# Patient Record
Sex: MALE | Race: WHITE | NOT HISPANIC OR LATINO | Employment: FULL TIME | URBAN - METROPOLITAN AREA
[De-identification: names, ages, dates, MRNs, and addresses within clinical notes are randomized per-mention and may not be internally consistent; named-entity substitution may affect disease eponyms.]

---

## 2023-06-13 ASSESSMENT — PATIENT HEALTH QUESTIONNAIRE - PHQ9
10. IF YOU CHECKED OFF ANY PROBLEMS, HOW DIFFICULT HAVE THESE PROBLEMS MADE IT FOR YOU TO DO YOUR WORK, TAKE CARE OF THINGS AT HOME, OR GET ALONG WITH OTHER PEOPLE: NOT DIFFICULT AT ALL
SUM OF ALL RESPONSES TO PHQ QUESTIONS 1-9: 17
SUM OF ALL RESPONSES TO PHQ QUESTIONS 1-9: 17

## 2023-06-14 ENCOUNTER — OFFICE VISIT (OUTPATIENT)
Dept: FAMILY MEDICINE | Facility: CLINIC | Age: 23
End: 2023-06-14
Payer: COMMERCIAL

## 2023-06-14 VITALS
OXYGEN SATURATION: 97 % | BODY MASS INDEX: 40.43 KG/M2 | TEMPERATURE: 98.9 F | DIASTOLIC BLOOD PRESSURE: 74 MMHG | HEIGHT: 74 IN | HEART RATE: 110 BPM | WEIGHT: 315 LBS | SYSTOLIC BLOOD PRESSURE: 108 MMHG

## 2023-06-14 DIAGNOSIS — R05.1 ACUTE COUGH: Primary | ICD-10-CM

## 2023-06-14 DIAGNOSIS — F33.1 MODERATE RECURRENT MAJOR DEPRESSION (H): ICD-10-CM

## 2023-06-14 DIAGNOSIS — J30.2 SEASONAL ALLERGIC RHINITIS, UNSPECIFIED TRIGGER: ICD-10-CM

## 2023-06-14 PROCEDURE — 90471 IMMUNIZATION ADMIN: CPT | Performed by: NURSE PRACTITIONER

## 2023-06-14 PROCEDURE — 99203 OFFICE O/P NEW LOW 30 MIN: CPT | Mod: 25 | Performed by: NURSE PRACTITIONER

## 2023-06-14 PROCEDURE — 90715 TDAP VACCINE 7 YRS/> IM: CPT | Performed by: NURSE PRACTITIONER

## 2023-06-14 RX ORDER — FLUTICASONE PROPIONATE 220 UG/1
2 AEROSOL, METERED RESPIRATORY (INHALATION) 2 TIMES DAILY
Qty: 12 G | Refills: 1 | Status: SHIPPED | OUTPATIENT
Start: 2023-06-14 | End: 2023-07-12

## 2023-06-14 RX ORDER — PREDNISONE 20 MG/1
20 TABLET ORAL DAILY
Qty: 5 TABLET | Refills: 0 | Status: SHIPPED | OUTPATIENT
Start: 2023-06-14

## 2023-06-14 ASSESSMENT — PATIENT HEALTH QUESTIONNAIRE - PHQ9
SUM OF ALL RESPONSES TO PHQ QUESTIONS 1-9: 17
10. IF YOU CHECKED OFF ANY PROBLEMS, HOW DIFFICULT HAVE THESE PROBLEMS MADE IT FOR YOU TO DO YOUR WORK, TAKE CARE OF THINGS AT HOME, OR GET ALONG WITH OTHER PEOPLE: NOT DIFFICULT AT ALL

## 2023-06-14 ASSESSMENT — COLUMBIA-SUICIDE SEVERITY RATING SCALE - C-SSRS
1. WITHIN THE PAST MONTH, HAVE YOU WISHED YOU WERE DEAD OR WISHED YOU COULD GO TO SLEEP AND NOT WAKE UP?: NO
6. HAVE YOU EVER DONE ANYTHING, STARTED TO DO ANYTHING, OR PREPARED TO DO ANYTHING TO END YOUR LIFE?: NO
2. IN THE PAST MONTH, HAVE YOU ACTUALLY HAD ANY THOUGHTS OF KILLING YOURSELF?: NO

## 2023-06-14 NOTE — PROGRESS NOTES
"  Assessment & Plan     (R05.1) Acute cough  (primary encounter diagnosis)  Comment: Acute cough  (primary encounter diagnosis)  Comment: symptoms which occur yearly suggests a reactive airway type condition.  Counseled that he might benefit from using a steroid inhaler every spring but at this time he should continue his Claritin, add a decongestant, start the oral prednisone as well as the Flovent and may need to continue the Flovent until this allergen has passed possibly 3 to 6 weeks.  He expresses understanding  Plan: predniSONE (DELTASONE) 20 MG tablet,         fluticasone (FLOVENT HFA) 220 MCG/ACT inhaler,         MISCELLANEOUS DME SUPPLY OR ACCESSORY, NOT         OTHERWISE SPECIFIED, Optichamber/Spacer Order         for DME - ONLY FOR DME          (J30.2) Seasonal allergic rhinitis, unspecified trigger  Comment: Continue with antihistamine  Plan: Optichamber/Spacer Order for DME - ONLY FOR DME            (F33.1) Moderate recurrent major depression (H)  Comment: Counseled that he might be a good candidate for medication but he is fearful of side effects and was once told that his type of depression would not benefit from medication.  He is agreeable to counseling and orders were placed  Plan: Adult Mental Health  Referral                         BMI:   Estimated body mass index is 45.28 kg/m  as calculated from the following:    Height as of this encounter: 1.88 m (6' 2\").    Weight as of this encounter: 160 kg (352 lb 11.2 oz).       Depression Screening Follow Up        6/13/2023     9:48 AM   PHQ   PHQ-9 Total Score 17   Q9: Thoughts of better off dead/self-harm past 2 weeks Nearly every day   F/U: Thoughts of suicide or self-harm Yes   F/U: Self harm-plan No   F/U: Self-harm action No   F/U: Safety concerns No         6/13/2023     9:48 AM   Last PHQ-9   1.  Little interest or pleasure in doing things 3   2.  Feeling down, depressed, or hopeless 3   3.  Trouble falling or staying asleep, or " sleeping too much 1   4.  Feeling tired or having little energy 1   5.  Poor appetite or overeating 2   6.  Feeling bad about yourself 3   7.  Trouble concentrating 1   8.  Moving slowly or restless 0   Q9: Thoughts of better off dead/self-harm past 2 weeks 3   PHQ-9 Total Score 17   In the past two weeks have you had thoughts of suicide or self harm? Yes   Do you have concerns about your personal safety or the safety of others? No   In the past 2 weeks have you thought about a plan or had intention to harm yourself? No   In the past 2 weeks have you acted on these thoughts in any way? No             6/14/2023     3:26 PM   C-SSRS ([facility])   Within the last month, have you wished you were dead or wished you could go to sleep and not wake up? No   Within the last month, have you had any actual thoughts of killing yourself? No   Within the last month, have you ever done anything, started to do anything, or prepared to do anything to end your life? No         Follow Up        Follow Up Actions Taken  Crisis resource information provided in the After Visit Summary  Mental Health Referral placed    Discussed the following ways the patient can remain in a safe environment:  be around others and He has supportive parents and a brother that lives with him that he can talk to      Sherry Beth NP  [facility]    Nallely Perez is a 23 year old, presenting for the following health issues: cough x2 weeks, cough is mostly dry, hurts throat to cough, not improving, did have some other symptoms too that have resolved - runny nose, HA, has not done any COVID testing - does have a positive PHQ9 today    Still itchy eyes and runny nose but cough isn't better  Uses Claritin most days  No decongestant or nasal spray use  Had asthma age 1, gets   No chief complaint on file.        6/14/2023     1:54 PM   Additional Questions   Roomed by yamila castillo   Accompanied by self     History of Present  "Illness       Reason for visit:  Hacking, persistent cough that is unresponsove to medication  Symptom onset:  1-2 weeks ago  Symptom intensity:  Moderate  Symptom progression:  Worsening  Had these symptoms before:  No    He eats 0-1 servings of fruits and vegetables daily.He consumes 4 sweetened beverage(s) daily.He exercises with enough effort to increase his heart rate 9 or less minutes per day.  He exercises with enough effort to increase his heart rate 3 or less days per week.   He is taking medications regularly.    Today's PHQ-9         PHQ-9 Total Score: 17    PHQ-9 Q9 Thoughts of better off dead/self-harm past 2 weeks :   Nearly every day  Thoughts of suicide or self harm: (P) Yes  Self-harm Plan:   (P) No  Self-harm Action:     (P) No  Safety concerns for self or others: (P) No    How difficult have these problems made it for you to do your work, take care of things at home, or get along with other people: Not difficult at all               Review of Systems         Objective    /74 (BP Location: Right arm, Patient Position: Sitting)   Pulse 110   Temp 98.9  F (37.2  C)   Ht 1.88 m (6' 2\")   Wt (!) 160 kg (352 lb 11.2 oz)   SpO2 97%   BMI 45.28 kg/m    Body mass index is 45.28 kg/m .  Physical Exam                       "

## 2023-06-23 ENCOUNTER — VIRTUAL VISIT (OUTPATIENT)
Dept: PSYCHOLOGY | Facility: CLINIC | Age: 23
End: 2023-06-23
Attending: NURSE PRACTITIONER
Payer: COMMERCIAL

## 2023-06-23 DIAGNOSIS — F33.1 MODERATE RECURRENT MAJOR DEPRESSION (H): ICD-10-CM

## 2023-06-23 PROCEDURE — 90837 PSYTX W PT 60 MINUTES: CPT | Mod: VID

## 2023-06-23 ASSESSMENT — ANXIETY QUESTIONNAIRES
3. WORRYING TOO MUCH ABOUT DIFFERENT THINGS: MORE THAN HALF THE DAYS
7. FEELING AFRAID AS IF SOMETHING AWFUL MIGHT HAPPEN: MORE THAN HALF THE DAYS
6. BECOMING EASILY ANNOYED OR IRRITABLE: MORE THAN HALF THE DAYS
7. FEELING AFRAID AS IF SOMETHING AWFUL MIGHT HAPPEN: MORE THAN HALF THE DAYS
IF YOU CHECKED OFF ANY PROBLEMS ON THIS QUESTIONNAIRE, HOW DIFFICULT HAVE THESE PROBLEMS MADE IT FOR YOU TO DO YOUR WORK, TAKE CARE OF THINGS AT HOME, OR GET ALONG WITH OTHER PEOPLE: SOMEWHAT DIFFICULT
2. NOT BEING ABLE TO STOP OR CONTROL WORRYING: NEARLY EVERY DAY
1. FEELING NERVOUS, ANXIOUS, OR ON EDGE: SEVERAL DAYS
GAD7 TOTAL SCORE: 14
GAD7 TOTAL SCORE: 14
4. TROUBLE RELAXING: MORE THAN HALF THE DAYS
GAD7 TOTAL SCORE: 14
5. BEING SO RESTLESS THAT IT IS HARD TO SIT STILL: MORE THAN HALF THE DAYS
8. IF YOU CHECKED OFF ANY PROBLEMS, HOW DIFFICULT HAVE THESE MADE IT FOR YOU TO DO YOUR WORK, TAKE CARE OF THINGS AT HOME, OR GET ALONG WITH OTHER PEOPLE?: SOMEWHAT DIFFICULT

## 2023-06-23 ASSESSMENT — COLUMBIA-SUICIDE SEVERITY RATING SCALE - C-SSRS
REASONS FOR IDEATION PAST MONTH: DOES NOT APPLY
TOTAL  NUMBER OF INTERRUPTED ATTEMPTS PAST 3 MONTHS: NO
3. HAVE YOU BEEN THINKING ABOUT HOW YOU MIGHT KILL YOURSELF?: YES
LETHALITY/MEDICAL DAMAGE CODE MOST LETHAL ACTUAL ATTEMPT: NO PHYSICAL DAMAGE OR VERY MINOR PHYSICAL DAMAGE
LETHALITY/MEDICAL DAMAGE CODE FIRST POTENTIAL ATTEMPT: BEHAVIOR NOT LIKELY TO RESULT IN INJURY
2. HAVE YOU ACTUALLY HAD ANY THOUGHTS OF KILLING YOURSELF?: YES
6. HAVE YOU EVER DONE ANYTHING, STARTED TO DO ANYTHING, OR PREPARED TO DO ANYTHING TO END YOUR LIFE?: NO
TOTAL  NUMBER OF ABORTED OR SELF INTERRUPTED ATTEMPTS LIFETIME: YES
TOTAL  NUMBER OF INTERRUPTED ATTEMPTS LIFETIME: 10
TOTAL  NUMBER OF INTERRUPTED ATTEMPTS LIFETIME: YES
1. IN THE PAST MONTH, HAVE YOU WISHED YOU WERE DEAD OR WISHED YOU COULD GO TO SLEEP AND NOT WAKE UP?: YES
LETHALITY/MEDICAL DAMAGE CODE MOST LETHAL POTENTIAL ATTEMPT: BEHAVIOR NOT LIKELY TO RESULT IN INJURY
5. HAVE YOU STARTED TO WORK OUT OR WORKED OUT THE DETAILS OF HOW TO KILL YOURSELF? DO YOU INTEND TO CARRY OUT THIS PLAN?: NO
ATTEMPT LIFETIME: YES
TOTAL  NUMBER OF ABORTED OR SELF INTERRUPTED ATTEMPTS PAST 3 MONTHS: NO
LETHALITY/MEDICAL DAMAGE CODE MOST RECENT POTENTIAL ATTEMPT: BEHAVIOR NOT LIKELY TO RESULT IN INJURY
2. HAVE YOU ACTUALLY HAD ANY THOUGHTS OF KILLING YOURSELF?: NO
REASONS FOR IDEATION LIFETIME: COMPLETELY TO END OR STOP THE PAIN (YOU COULDN'T GO ON LIVING WITH THE PAIN OR HOW YOU WERE FEELING)
1. HAVE YOU WISHED YOU WERE DEAD OR WISHED YOU COULD GO TO SLEEP AND NOT WAKE UP?: YES
ATTEMPT PAST THREE MONTHS: NO
TOTAL  NUMBER OF ACTUAL ATTEMPTS LIFETIME: 10
4. HAVE YOU HAD THESE THOUGHTS AND HAD SOME INTENTION OF ACTING ON THEM?: YES

## 2023-06-23 ASSESSMENT — PATIENT HEALTH QUESTIONNAIRE - PHQ9
SUM OF ALL RESPONSES TO PHQ QUESTIONS 1-9: 19
SUM OF ALL RESPONSES TO PHQ QUESTIONS 1-9: 19
10. IF YOU CHECKED OFF ANY PROBLEMS, HOW DIFFICULT HAVE THESE PROBLEMS MADE IT FOR YOU TO DO YOUR WORK, TAKE CARE OF THINGS AT HOME, OR GET ALONG WITH OTHER PEOPLE: NOT DIFFICULT AT ALL

## 2023-06-23 NOTE — PROGRESS NOTES
[facility]   Mental Health & Addiction Services     Progress Note - Initial Visit    Patient  Name:  Chris Pelaez Date: 23         Service Type: Individual     Visit Start Time: 11:00 AM  Visit End Time: 12:10 PM    Visit #: 1    Attendees: Client attended alone    Service Modality:  Video Visit:      Provider verified identity through the following two step process.  Patient provided:  Patient  and Patient address    Telemedicine Visit: The patient's condition can be safely assessed and treated via synchronous audio and visual telemedicine encounter.      Reason for Telemedicine Visit: Services only offered telehealth    Originating Site (Patient Location): Patient's home    Distant Site (Provider Location): Provider Remote Setting- Home Office    Consent:  The patient/guardian has verbally consented to: the potential risks and benefits of telemedicine (video visit) versus in person care; bill my insurance or make self-payment for services provided; and responsibility for payment of non-covered services.     Patient would like the video invitation sent by:  My Chart    Mode of Communication:  Video Conference via Amwell    Distant Location (Provider):  Off-site    As the provider I attest to compliance with applicable laws and regulations related to telemedicine.       DATA:   Extended Session (53+ minutes):   - Patient's presenting concerns require more intensive intervention than could be completed within the usual service  Interactive Complexity: No  Crisis: No      Presenting Concerns/  Current Stressors:   Patient reports recently going to the doctor due to throat troubles involving the air quality Patient reports recently receiving a diagnosis for asthma aggravated by the poor air quality. Patient also reports receiving a referral from his primary care doctor due to his responses to the PHQ-9 assessment. Today, patient and therapist started the diagnostic assessment, but  were unable to complete due to time constraints.  Therapist assessed for risk and safety - no concerns at this time.  Patient and therapist will continue with the DA during next session.     ASSESSMENT:  Mental Status Assessment:  Appearance:   Appropriate   Eye Contact:   Fair   Psychomotor Behavior: Normal   Attitude:   Cooperative  Interested  Orientation:   All  Speech   Rate / Production: Normal/ Responsive   Volume:  Normal   Mood:    Depressed  Normal  Affect:    Appropriate   Thought Content:  Clear   Thought Form:  Coherent   Insight:    Good       Safety Issues and Plan for Safety and Risk Management:   [place] Suicide Severity Rating Scale (Lifetime/Recent)      6/23/2023    11:42 AM   [place] Suicide Severity Rating (Lifetime/Recent)   1. Wish to be Dead (Lifetime) Y   Wish to be Dead Description (Lifetime) Patient reports when he was younger hitting his head   1. Wish to be Dead (Past 1 Month) Y   Wish to be Dead Description (Past 1 Month) Passive suicide   2. Non-Specific Active Suicidal Thoughts (Lifetime) Y   Non-Specific Active Suicidal Thought Description (Lifetime) Younger   2. Non-Specific Active Suicidal Thoughts (Past 1 Month) N   3. Active Suicidal Ideation with any Methods (Not Plan) Without Intent to Act (Lifetime) Y   Active Suicidal Ideation with any Methods (Not Plan) Description (Lifetime) Hitting his head   3. Active Suicidal Ideation with any Methods (Not Plan) Without Intent to Act (Past 1 Month) N   4. Active Suicidal Ideation with Some Intent to Act, Without Specific Plan (Lifetime) Y   Active Suicidal Ideation with Some Intent to Act, Without Specific Plan Description (Lifetime) Middle School   5. Active Suicidal Ideation with Specific Plan and Intent (Lifetime) N   Most Severe Ideation Rating (Lifetime) 5   Description of Most Severe Ideation (Lifetime) Middle School   Most Severe Ideation Rating (Past 1 Month) 1   Frequency (Lifetime) 4   Frequency (Past 1 Month) 1   Duration  (Lifetime) 5   Duration (Past 1 Month) 1   Controllability (Lifetime) 5   Controllability (Past 1 Month) 0   Deterrents (Lifetime) 2   Deterrents (Past 1 Month) 0   Reasons for Ideation (Lifetime) 5   Reasons for Ideation (Past 1 Month) 0   Actual Attempt (Lifetime) Y   Total Number of Actual Attempts (Lifetime) 10   Actual Attempt Description (Lifetime) Elementary-Middle School   Actual Attempt (Past 3 Months) N   Has subject engaged in non-suicidal self-injurious behavior? (Lifetime) Y   Has subject engaged in non-suicidal self-injurious behavior? (Past 3 Months) N   Interrupted Attempts (Lifetime) Y   Total Number of Interrupted Attempts (Lifetime) 10   Interrupted Attempt Description (Lifetime) He would become knocked out   Interrupted Attempts (Past 3 Months) N   Aborted or Self-Interrupted Attempt (Lifetime) Y   Aborted or Self-Interrupted Attempt (Past 3 Months) N   Preparatory Acts or Behavior (Lifetime) N   Potential Lethality Code (Most Recent Attempt) 0   Actual Lethality/Medical Damage Code (Most Lethal Attempt) 0   Potential Lethality Code (Most Lethal Attempt) 0   Potential Lethality Code (Initial/First Attempt) 0   Calculated C-SSRS Risk Score (Lifetime/Recent) Moderate Risk     Patient denies current fears or concerns for personal safety.  Patient reports the following current or recent suicidal ideation or behaviors: passive suicidal ideation.  Patient denies current or recent homicidal ideation or behaviors.  Patient denies current or recent self injurious behavior or ideation.  Patient denies other safety concerns.  Recommended that patient call 911 or go to the local ED should there be a change in any of these risk factors.  Patient reports there are firearms in the house. The firearms are secured in a locked space.     Diagnostic Criteria:  Major Depressive Disorder   - Depressed mood. Note: In children and adolescents, can be irritable mood.     - Diminished interest or pleasure in all, or  "almost all, activities.    - Fatigue or loss of energy.    - Feelings of worthlessness or inappropriate and excessive guilt.    - Diminished ability to think or concentrate, or indecisiveness.    - Recurrent thoughts of death (not just fear of dying), recurrent suicidal ideation without a specific plan, or a suicide attempt or a specific plan for committing suicide.   D) The occurence of major depressive episode is not better explained by other thought / psychotic disorders      DSM5 Diagnoses: (Sustained by DSM5 Criteria Listed Above)  Diagnoses: 296.32 (F33.1) Major Depressive Disorder, Recurrent Episode, Moderate _ and With anxious distress  Psychosocial & Contextual Factors: Patient reports being \"miserable\" all of the time.   WHODAS 2.0 (12 item):        No data to display              Intervention:   Completed through review of safety issues and safety interventions, Completed Safety plan and Educated on treatment planning and started identifying goals and interventions for treatment plan  Collateral Reports Completed:  Not Applicable      PLAN: (Homework, other):  1. Provider will continue Diagnostic Assessment.  Patient was given the following to do until next session: NA.     2. Provider recommended the following referrals: NA.      3.  Suicide Risk and Safety Concerns were assessed for Chris Pelaez.    Patient meets the following risk assessment and triage:   Moderate Risk is identified when the patient has one of the following:  Suicidal Ideation with method (The How) Without plan, intent, or behavior in the past month (Yes to C-SSRS Ideation #3)    The following has been recommended:  Complete/Review/Update Safety Plan    Safety Plan:  Adult Short Safety Plan:   Name: Chris Pelaez  YOB: 2000  Date: June 23, 2023   My primary care provider: Hillary Ref-Primary, Physician  My primary care clinic: NA  My prescriber: NA  Other care team support:     My Triggers:  Stress, confrontation, and " "thinking about his mental state and where it has been at.     Additional People, Places, and Things that I can access for support: His family and his brother         What is important to me and makes life worth living: \"The fact that he doesn't feel like dying\".       GREEN    Good Control  1. I feel good  2. No suicidal thoughts   3. Can work, sleep and play      Action Steps  1. Self-care: balanced meals, exercising, sleep practices, etc.  2. Take your medications as prescribed.  3. Continue meetings with therapist and prescriber.  4.  Do the healthy things that I enjoy.           YELLOW  Getting Worse  I have ANY of these:  1. I do not feel good  2. Difficulty Concentrating  3. Sleep is changing  4. Increase/Change in my thoughts to hurt self and/or others, but I can still manage and not act on it.   5. Not taking care of self.             Action Steps (in addition to the above):  1. Inform your therapist and psychiatric prescriber/PCP.  2. Keep taking your medications as prescribed.    3. Turn to people you can ask for help.  4. Use internal coping strategies -see below.  5. Create safe environment: notify friends/family of increase in symptoms           RED  Get Help  If I have ANY of these:  1. Current and uncontrollable thoughts and/or behaviors to hurt self and/or others.    Actions to manage my safety  1. Contact your emergency person- his Father [phone]  2. Call or Text [phone]  3. Call my crisis team- [facility] [phone]  3. Or Call 911 or go to the emergency room right away  4.         My Internal Coping Strategies include the following:  use my coping skills    [End for Brief Safety Plan]     Safety Concerns  How To Identify Situations That Make Your Mental Health Worse:  Triggers are things that make your mental health worse.  Look at the list below to help you find your triggers and what you can do about them.     1. Identify Early Warning Signs:    Sometimes symptoms return, even when " people do their best to stay well. Symptoms can develop over a short period of time with little or no warning, but most of the time they emerge gradually over several weeks.  Early warning signs are changes that people experience when a relapse is starting. Some early warning signs are common and others are not as common.   Common Early Warning Signs:    Urges to harm self     2. Identify action steps to take when warning signs are noticed:    Taking Action- It is important to take action if you are experiencing early warning signs of a relapse.  The faster you act, the more likely it is that you can avoid a full relapse.  It is helpful to identify several specific ways to cope with symptoms.      The following is my list of symptoms and coping strategies that I can use when they are present:    Symptom Coping Strategies   Anxiety -Talk with someone in your support system and let him or her know how you are feeling.  -Use relaxation techniques such as deep breathing or imagery.  -Use positive affirmations to counteract negative self-talk such as  I am learning to let go of worry.    Depression - Schedule your day; include activities you have to do and activities you enjoy doing.  - Get some exercise - walk, run, bike, or swim.  - Give yourself credit for even the smallest things you get done.   Sleep Difficulties   - Go to sleep at the same time every day.  - Do something relaxing before bed, such as drinking herbal tea or listening to music.  - Avoid having discussions about upsetting topics before going to bed.   Delusions   - Distract yourself from the disturbing thought by doing something that requires your attention such as a puzzle.  - Check out your beliefs by talking to someone you trust.    Hallucinations   - Use headphones to listen to music.  - Tell voices to  stop  or say to yourself,  I am safe.   - Ignore the hallucinations as much as possible; focus on other things.   Concentration Difficulties -  Minimize distractions so there is only one thing for you to focus on at a time.    - Ask the person you are having a conversation with to slow down or repeat things you are unsure of.                 LILY TORRES  June 23, 2023    This note has been reviewed and I agree with the plan of care. This note is co-signed by CIRO Cosby, [facility] Center[facility], Supervisor, on: June 28, 2023

## 2023-07-12 DIAGNOSIS — R05.1 ACUTE COUGH: ICD-10-CM

## 2023-07-12 RX ORDER — BUDESONIDE 180 UG/1
1 AEROSOL, POWDER RESPIRATORY (INHALATION) 2 TIMES DAILY
Qty: 3 EACH | Refills: 3 | Status: SHIPPED | OUTPATIENT
Start: 2023-07-12

## 2023-07-12 NOTE — TELEPHONE ENCOUNTER
Routing refill request to provider for review/approval because:  LOV 6/04/2023         MATILDA Boudreaux  [facility]   Dressing (No Sutures): dry sterile dressing

## 2023-07-23 ENCOUNTER — HEALTH MAINTENANCE LETTER (OUTPATIENT)
Age: 23
End: 2023-07-23

## 2023-08-20 PROCEDURE — 99285 EMERGENCY DEPT VISIT HI MDM: CPT | Mod: 25

## 2023-08-20 PROCEDURE — 96374 THER/PROPH/DIAG INJ IV PUSH: CPT

## 2023-08-21 ENCOUNTER — APPOINTMENT (OUTPATIENT)
Dept: ULTRASOUND IMAGING | Facility: HOSPITAL | Age: 23
End: 2023-08-21
Attending: EMERGENCY MEDICINE
Payer: COMMERCIAL

## 2023-08-21 ENCOUNTER — HOSPITAL ENCOUNTER (EMERGENCY)
Facility: HOSPITAL | Age: 23
Discharge: HOME | End: 2023-08-21
Attending: EMERGENCY MEDICINE | Admitting: EMERGENCY MEDICINE
Payer: COMMERCIAL

## 2023-08-21 VITALS
HEIGHT: 74 IN | WEIGHT: 315 LBS | TEMPERATURE: 98.7 F | HEART RATE: 98 BPM | OXYGEN SATURATION: 97 % | SYSTOLIC BLOOD PRESSURE: 102 MMHG | BODY MASS INDEX: 40.43 KG/M2 | RESPIRATION RATE: 18 BRPM | DIASTOLIC BLOOD PRESSURE: 65 MMHG

## 2023-08-21 DIAGNOSIS — R10.13 ABDOMINAL PAIN, EPIGASTRIC: ICD-10-CM

## 2023-08-21 LAB
ALBUMIN SERPL BCG-MCNC: 4.7 G/DL (ref 3.5–5.2)
ALP SERPL-CCNC: 138 U/L (ref 40–129)
ALT SERPL W P-5'-P-CCNC: 31 U/L (ref 0–70)
ANION GAP SERPL CALCULATED.3IONS-SCNC: 14 MMOL/L (ref 7–15)
AST SERPL W P-5'-P-CCNC: 29 U/L (ref 0–45)
BILIRUB SERPL-MCNC: 1 MG/DL
BUN SERPL-MCNC: 14.2 MG/DL (ref 6–20)
CALCIUM SERPL-MCNC: 10 MG/DL (ref 8.6–10)
CHLORIDE SERPL-SCNC: 99 MMOL/L (ref 98–107)
CREAT SERPL-MCNC: 1.03 MG/DL (ref 0.67–1.17)
D DIMER PPP FEU-MCNC: 0.37 UG/ML FEU (ref 0–0.5)
DEPRECATED HCO3 PLAS-SCNC: 24 MMOL/L (ref 22–29)
ERYTHROCYTE [DISTWIDTH] IN BLOOD BY AUTOMATED COUNT: 14.6 % (ref 10–15)
GFR SERPL CREATININE-BSD FRML MDRD: >90 ML/MIN/1.73M2
GLUCOSE SERPL-MCNC: 128 MG/DL (ref 70–99)
HCT VFR BLD AUTO: 54.6 % (ref 40–53)
HGB BLD-MCNC: 18.1 G/DL (ref 13.3–17.7)
HOLD SPECIMEN: NORMAL
HOLD SPECIMEN: NORMAL
LIPASE SERPL-CCNC: 37 U/L (ref 13–60)
MCH RBC QN AUTO: 27.7 PG (ref 26.5–33)
MCHC RBC AUTO-ENTMCNC: 33.2 G/DL (ref 31.5–36.5)
MCV RBC AUTO: 84 FL (ref 78–100)
PLATELET # BLD AUTO: 405 10E3/UL (ref 150–450)
POTASSIUM SERPL-SCNC: 4.1 MMOL/L (ref 3.4–5.3)
PROT SERPL-MCNC: 8.6 G/DL (ref 6.4–8.3)
RBC # BLD AUTO: 6.53 10E6/UL (ref 4.4–5.9)
SODIUM SERPL-SCNC: 137 MMOL/L (ref 136–145)
WBC # BLD AUTO: 9.5 10E3/UL (ref 4–11)

## 2023-08-21 PROCEDURE — 36415 COLL VENOUS BLD VENIPUNCTURE: CPT | Performed by: STUDENT IN AN ORGANIZED HEALTH CARE EDUCATION/TRAINING PROGRAM

## 2023-08-21 PROCEDURE — 80053 COMPREHEN METABOLIC PANEL: CPT | Performed by: EMERGENCY MEDICINE

## 2023-08-21 PROCEDURE — 250N000011 HC RX IP 250 OP 636: Performed by: EMERGENCY MEDICINE

## 2023-08-21 PROCEDURE — 85027 COMPLETE CBC AUTOMATED: CPT | Performed by: EMERGENCY MEDICINE

## 2023-08-21 PROCEDURE — 83690 ASSAY OF LIPASE: CPT | Performed by: EMERGENCY MEDICINE

## 2023-08-21 PROCEDURE — 85379 FIBRIN DEGRADATION QUANT: CPT | Performed by: EMERGENCY MEDICINE

## 2023-08-21 PROCEDURE — 76705 ECHO EXAM OF ABDOMEN: CPT

## 2023-08-21 PROCEDURE — 36415 COLL VENOUS BLD VENIPUNCTURE: CPT | Performed by: EMERGENCY MEDICINE

## 2023-08-21 RX ORDER — KETOROLAC TROMETHAMINE 30 MG/ML
30 INJECTION, SOLUTION INTRAMUSCULAR; INTRAVENOUS ONCE
Status: COMPLETED | OUTPATIENT
Start: 2023-08-21 | End: 2023-08-21

## 2023-08-21 RX ORDER — OMEPRAZOLE 40 MG/1
40 CAPSULE, DELAYED RELEASE ORAL DAILY
Qty: 30 CAPSULE | Refills: 0 | Status: SHIPPED | OUTPATIENT
Start: 2023-08-21

## 2023-08-21 RX ADMIN — KETOROLAC TROMETHAMINE 30 MG: 30 INJECTION INTRAMUSCULAR; INTRAVENOUS at 01:33

## 2023-08-21 ASSESSMENT — ACTIVITIES OF DAILY LIVING (ADL): ADLS_ACUITY_SCORE: 35

## 2023-08-21 NOTE — ED TRIAGE NOTES
Patient reports abdominal pain across his upper abdomen that started Friday. Denies nausea, vomiting or diarrhea.      Triage Assessment       Row Name 08/20/23 [id number]       Triage Assessment (Adult)    Airway WDL WDL       Respiratory WDL    Respiratory WDL WDL       Cardiac WDL    Cardiac WDL WDL       Cognitive/Neuro/Behavioral WDL    Cognitive/Neuro/Behavioral WDL WDL

## 2023-08-21 NOTE — ED PROVIDER NOTES
EMERGENCY DEPARTMENT ENCOUNTER      NAME: Chris Pelaez  AGE: 23 year old male  YOB: 2000  MRN: [id number]  EVALUATION DATE & TIME: 8/21/2023  1:06 AM    PCP: No Ref-Primary, Physician    ED PROVIDER: Kaur Peres MD    Chief Complaint   Patient presents with    Abdominal Pain         FINAL IMPRESSION:  1. Abdominal pain, epigastric          ED COURSE & MEDICAL DECISION MAKING:    Pertinent Labs & Imaging studies reviewed. (See chart for details)  23 year old male with history of morbid obesity, asthma, depression who presents to the Emergency Department for evaluation of upper abdominal pain x2 days.  Differential includes GERD, gastritis, pancreatitis, hepatobiliary pathology.  He does note that it is also slightly worse with both movement and inspiration.  Slightly tachycardic.  Concern for PE though really no risk factors for same.  Pain is really not made reproducible with palpation of the chest wall on examination but musculoskeletal etiology on the differential.    Patient placed on monitor, IV established and blood obtained.  Given 30 mg Toradol for pain.  CBC, CMP, lipase, D-dimer obtained with minimally elevated alk phos of 138 and slight hemoconcentration with hemoglobin of 18.1.  Given strong family history of cholelithiasis and his location of pain a dedicated right upper quadrant ultrasound was obtained, unremarkable.  Given negative ED work-up above, safe for discharge to home.  He does not describe much pain worse with oral intake but seems how everything else is negative above, will trial antacid for home.  Given PCP referral to establish care, warning signs return to ED discussed.      ED Course as of 08/21/23 0444   Mon Aug 21, 2023   0111 I met with the patient to gather history and perform my exam. ED course and treatment discussed.    0130 Hemoglobin(!): 18.1  Hemoconcentrated   0156 D-Dimer Quantitative: 0.37   0232 Abdomen US, limited (RUQ only)  Ultrasound independently  interpreted by myself without visualized cholelithiasis   0241 I updated the patient with lab and imaging results and discussed the plan for discharge.          Medical Decision Making    History:  Supplemental history from: Family Member/Significant Other  External Record(s) reviewed: Outpatient Record: 6/14/2023 office visit    Work Up:  Chart documentation includes differential considered and any EKGs or imaging independently interpreted by provider, see MDM  In additional to work up documented, I considered the following work up: see MDM    External consultation:  Discussion of management with another provider: N/A    Complicating factors:  Care impacted by chronic illness: Chronic Lung Disease and Mental Health  Care affected by social determinants of health: Access to Medical Care weekend night shift no access to PCP    Disposition considerations: Discharge. I prescribed additional prescription strength medication(s) as charted. See documentation for any additional details.        At the conclusion of the encounter I discussed the results of all of the tests and the disposition. The questions were answered. The patient or family acknowledged understanding and was agreeable with the care plan.      MEDICATIONS GIVEN IN THE EMERGENCY:  Medications   ketorolac (TORADOL) injection 30 mg (30 mg Intravenous $Given 8/21/23 0133)       NEW PRESCRIPTIONS STARTED AT TODAY'S ER VISIT  Discharge Medication List as of 8/21/2023  2:45 AM        START taking these medications    Details   omeprazole (PRILOSEC) 40 MG DR capsule Take 1 capsule (40 mg) by mouth daily, Disp-30 capsule, R-0, Local Print                =================================================================    HPI    Patient information was obtained from: Patient     Use of Intrepreter: N/A      Chris TINOCO Benigno is a 23 year old male with pertinent history of obesity, asthma, and depression who presents with abdominal pain     The patient reports  "epigastric abdominal pain for three days that worsens with deep breathing and movement. The patient denies nausea, vomiting, problems with bowel movements, problems with urination, problems with eating, con, shortness of breath, back pain, or any other symptoms or complaints. The patient denies a history of blood clots. The patient's brother notes that the patient is the only one in their family who has not had a cholecystectomy.    PAST MEDICAL HISTORY:  Past Medical History:   Diagnosis Date    Depressive disorder     Uncomplicated asthma        PAST SURGICAL HISTORY:  No past surgical history on file.    CURRENT MEDICATIONS:    Prior to Admission Medications   Prescriptions Last Dose Informant Patient Reported? Taking?   budesonide (PULMICORT FLEXHALER) 180 MCG/ACT inhaler   No No   Sig: Inhale 1 puff into the lungs 2 times daily   predniSONE (DELTASONE) 20 MG tablet   No No   Sig: Take 1 tablet (20 mg) by mouth daily      Facility-Administered Medications: None       ALLERGIES:  No Known Allergies    FAMILY HISTORY:  No family history on file.    SOCIAL HISTORY:  Social History     Tobacco Use    Smoking status: Never     Passive exposure: Current    Smokeless tobacco: Never   Vaping Use    Vaping Use: Never used   Substance Use Topics    Alcohol use: No    Drug use: No        VITALS:  Patient Vitals for the past 24 hrs:   BP Temp Temp src Pulse Resp SpO2 Height Weight   08/21/23 0245 102/65 -- -- 98 -- 97 % -- --   08/21/23 0145 126/64 -- -- 80 -- 96 % -- --   08/21/23 0130 119/62 -- -- 79 -- 98 % -- --   08/20/23 2353 -- -- -- -- -- -- -- (!) 159.7 kg (352 lb)   08/20/23 2351 -- -- -- -- -- -- 1.88 m (6' 2\") --   08/20/23 2350 136/87 98.7  F (37.1  C) Temporal 105 18 97 % -- --       PHYSICAL EXAM    General Appearance: Well-appearing, no acute distress. Patient is obese and disheveled   Head:  Normocephalic  Neck:  Supple  Chest:  No tenderness or deformity, no crepitus  Cardio:  Regular rhythm, no " murmur/gallop/rub. Minimally tachycardic to 105   Pulm:  No respiratory distress, clear to auscultation bilaterally  Back:  No CVA tenderness, normal ROM  Abdomen:  Soft, non distended,no rebound or guarding. No reproducible abdominal tenderness on exam  Extremities: Moves extremities normally, normal gait.  No peripheral edema  Neuro:  Alert and oriented ×3    RADIOLOGY/LABS:  Reviewed all pertinent imaging. Please see official radiology report. All pertinent labs reviewed and interpreted.    Results for orders placed or performed during the hospital encounter of 08/21/23   Abdomen US, limited (RUQ only)    Impression    IMPRESSION:  Normal limited abdominal ultrasound.       Extra Green Top (Lithium Heparin) Tube   Result Value Ref Range    Hold Specimen JIC    Extra Purple Top Tube   Result Value Ref Range    Hold Specimen JIC    CBC (+ platelets, no diff)   Result Value Ref Range    WBC Count 9.5 4.0 - 11.0 10e3/uL    RBC Count 6.53 (H) 4.40 - 5.90 10e6/uL    Hemoglobin 18.1 (H) 13.3 - 17.7 g/dL    Hematocrit 54.6 (H) 40.0 - 53.0 %    MCV 84 78 - 100 fL    MCH 27.7 26.5 - 33.0 pg    MCHC 33.2 31.5 - 36.5 g/dL    RDW 14.6 10.0 - 15.0 %    Platelet Count 405 150 - 450 10e3/uL   Comprehensive metabolic panel   Result Value Ref Range    Sodium 137 136 - 145 mmol/L    Potassium 4.1 3.4 - 5.3 mmol/L    Chloride 99 98 - 107 mmol/L    Carbon Dioxide (CO2) 24 22 - 29 mmol/L    Anion Gap 14 7 - 15 mmol/L    Urea Nitrogen 14.2 6.0 - 20.0 mg/dL    Creatinine 1.03 0.67 - 1.17 mg/dL    Calcium 10.0 8.6 - 10.0 mg/dL    Glucose 128 (H) 70 - 99 mg/dL    Alkaline Phosphatase 138 (H) 40 - 129 U/L    AST 29 0 - 45 U/L    ALT 31 0 - 70 U/L    Protein Total 8.6 (H) 6.4 - 8.3 g/dL    Albumin 4.7 3.5 - 5.2 g/dL    Bilirubin Total 1.0 <=1.2 mg/dL    GFR Estimate >90 >60 mL/min/1.73m2   Result Value Ref Range    Lipase 37 13 - 60 U/L   D dimer quantitative   Result Value Ref Range    D-Dimer Quantitative 0.37 0.00 - 0.50 ug/mL FEU          The creation of this record is based on the scribe s observations of the work being performed by Kaur Peres MD and the provider s statements to them. It was created on her behalf by Antonina Neely, a trained medical scribe. This document has been checked and approved by the attending provider.    Kaur Peres MD  Emergency Medicine  [facility] EMERGENCY DEPARTMENT  [address] [place] [id number] [phone]  Dept: [phone]     Kaur Peres MD  08/21/23 0444

## 2023-08-21 NOTE — DISCHARGE INSTRUCTIONS
Your laboratory work-up today was reassuringly normal.  Ultrasound also looks normal.  Given reassuring ED work-up and location of your pain we are going to trial an antacid.  Take prescription antacid as prescribed.  Follow-up with primary care doctor to establish care, referral provided.

## 2023-11-29 ENCOUNTER — FCC EXTENDED DOCUMENTATION (OUTPATIENT)
Dept: PSYCHOLOGY | Facility: CLINIC | Age: 23
End: 2023-11-29
Payer: COMMERCIAL

## 2023-11-29 NOTE — PROGRESS NOTES
"                    Discharge Summary  Single Session    Client Name: Chris Pelaez MRN#: [id number] YOB: 2000      Intake / Discharge Date: 11/29/23       DSM5 Diagnoses: (Sustained by DSM5 Criteria Listed Above)  Diagnoses: 296.22 (F32.1)  Major Depressive Disorder, Single Episode, Moderate With anxious distress  Psychosocial & Contextual Factors: Patient reports being \"miserable\" all of the time.   PROMIS-10 Score: 20          Presenting Concern:  Patient reported going to see his primary care doctor and being referred to be seen by a therapist due to his PHQ-9 score. Patient also reported to receiving a diagnosis for asthma due to poor air quality in his town.       Reason for Discharge:  Provider is leaving the [facility]      Disposition at Time of Last Encounter:   Comments:   Patient's mood was depressed and his mood was down.      Risk Management:   Client has had a history of suicidal ideation: passive suicidal ideations and self-injurious behavior: patient reported to hitting his head against the wall when he was younger.   A safety and risk management plan has been developed including: Patient consented to co-developed safety plan on 06/23/23.  Safety and risk management plan was reviewed.   Patient agreed to use safety plan should any safety concerns arise.  A copy was made available to the patient.      Referred To:  Client may resume counseling services at any time in the future by calling the [facility] Intake Office, [phone].        LILY TORRES   11/29/2023   "

## 2024-09-15 ENCOUNTER — HEALTH MAINTENANCE LETTER (OUTPATIENT)
Age: 24
End: 2024-09-15